# Patient Record
Sex: FEMALE | Race: WHITE | NOT HISPANIC OR LATINO | Employment: FULL TIME | URBAN - METROPOLITAN AREA
[De-identification: names, ages, dates, MRNs, and addresses within clinical notes are randomized per-mention and may not be internally consistent; named-entity substitution may affect disease eponyms.]

---

## 2024-08-06 ENCOUNTER — OFFICE VISIT (OUTPATIENT)
Dept: OBSTETRICS AND GYNECOLOGY | Facility: CLINIC | Age: 24
End: 2024-08-06
Payer: COMMERCIAL

## 2024-08-06 ENCOUNTER — LAB (OUTPATIENT)
Dept: LAB | Facility: LAB | Age: 24
End: 2024-08-06
Payer: COMMERCIAL

## 2024-08-06 VITALS — HEIGHT: 67 IN

## 2024-08-06 DIAGNOSIS — S30.1XXA TRAUMATIC ECCHYMOSIS OF GROIN, INITIAL ENCOUNTER: Primary | ICD-10-CM

## 2024-08-06 DIAGNOSIS — N91.1 SECONDARY AMENORRHEA: ICD-10-CM

## 2024-08-06 DIAGNOSIS — Z86.59 HX OF EATING DISORDER: ICD-10-CM

## 2024-08-06 DIAGNOSIS — N91.2 AMENORRHEA: ICD-10-CM

## 2024-08-06 LAB
ESTRADIOL SERPL-MCNC: 25 PG/ML
FSH SERPL-ACNC: 4.7 IU/L
PREGNANCY TEST URINE, POC: NEGATIVE
PROLACTIN SERPL-MCNC: 2.4 UG/L (ref 3–20)
TSH SERPL-ACNC: 1.83 MIU/L (ref 0.44–3.98)

## 2024-08-06 PROCEDURE — 36415 COLL VENOUS BLD VENIPUNCTURE: CPT

## 2024-08-06 PROCEDURE — 83001 ASSAY OF GONADOTROPIN (FSH): CPT

## 2024-08-06 PROCEDURE — 99203 OFFICE O/P NEW LOW 30 MIN: CPT

## 2024-08-06 PROCEDURE — 1036F TOBACCO NON-USER: CPT

## 2024-08-06 PROCEDURE — 84146 ASSAY OF PROLACTIN: CPT

## 2024-08-06 PROCEDURE — 81025 URINE PREGNANCY TEST: CPT

## 2024-08-06 PROCEDURE — 84443 ASSAY THYROID STIM HORMONE: CPT

## 2024-08-06 PROCEDURE — 82670 ASSAY OF TOTAL ESTRADIOL: CPT

## 2024-08-06 ASSESSMENT — PATIENT HEALTH QUESTIONNAIRE - PHQ9
2. FEELING DOWN, DEPRESSED OR HOPELESS: NOT AT ALL
1. LITTLE INTEREST OR PLEASURE IN DOING THINGS: NOT AT ALL
SUM OF ALL RESPONSES TO PHQ9 QUESTIONS 1 & 2: 0

## 2024-08-06 NOTE — PROGRESS NOTES
Subjective   Patient ID: Rosmery Mc is a 24 y.o. female who presents for Vaginal bump.  HPI  New patient here today for concerns of a right  groin bump that has been present for the last month. Patient reports that bump can be painful during exercise or when fabric rubs against area. Patient denies any known injury to area. Patient concerned as the bump has not resolved.     Of note patient has not had a menstrual cycle in over a year. Patient has a hx of anorexia and is currently under the care of a nutritionist. Patient declined weight at today's  visit. Patient reports exercise 6 x a week.     Review of Systems    Objective   Physical Exam  Constitutional:       Comments: underweight   Eyes:      Conjunctiva/sclera: Conjunctivae normal.   Pulmonary:      Effort: Pulmonary effort is normal.   Abdominal:          Comments: Ecchymosis noted in right groin measuring approximately 2x7 cm. 4 cm of affected area firm.    Neurological:      Mental Status: She is alert.   Psychiatric:         Mood and Affect: Mood normal.           Assessment/Plan   Right groin mass and bruising- suspicious for hematoma. Consulted with Dr. Barbosa via secure chat who suggests patient follow up with her PCP.  Patient instructed to limit activity until mass has resolved.     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 Will order estradiol, FSH, prolactin, and TSH to rule out other  possible etiologies. Will refer to endocrinology as needed.   BEVERLY Teran 08/06/24 11:16 AM

## 2024-08-07 ENCOUNTER — OFFICE VISIT (OUTPATIENT)
Dept: PRIMARY CARE | Facility: CLINIC | Age: 24
End: 2024-08-07
Payer: COMMERCIAL

## 2024-08-07 VITALS
DIASTOLIC BLOOD PRESSURE: 68 MMHG | OXYGEN SATURATION: 99 % | HEIGHT: 67 IN | TEMPERATURE: 97.5 F | HEART RATE: 54 BPM | SYSTOLIC BLOOD PRESSURE: 118 MMHG

## 2024-08-07 DIAGNOSIS — L08.9 LOCALIZED BACTERIAL SKIN INFECTION: Primary | ICD-10-CM

## 2024-08-07 DIAGNOSIS — N91.2 AMENORRHEA: Primary | ICD-10-CM

## 2024-08-07 DIAGNOSIS — B96.89 LOCALIZED BACTERIAL SKIN INFECTION: Primary | ICD-10-CM

## 2024-08-07 PROCEDURE — 1036F TOBACCO NON-USER: CPT | Performed by: NURSE PRACTITIONER

## 2024-08-07 PROCEDURE — 99214 OFFICE O/P EST MOD 30 MIN: CPT | Performed by: NURSE PRACTITIONER

## 2024-08-07 RX ORDER — CEPHALEXIN 500 MG/1
500 CAPSULE ORAL 2 TIMES DAILY
Qty: 14 CAPSULE | Refills: 0 | Status: SHIPPED | OUTPATIENT
Start: 2024-08-07 | End: 2024-08-14

## 2024-08-07 ASSESSMENT — PATIENT HEALTH QUESTIONNAIRE - PHQ9
SUM OF ALL RESPONSES TO PHQ9 QUESTIONS 1 AND 2: 0
2. FEELING DOWN, DEPRESSED OR HOPELESS: NOT AT ALL
1. LITTLE INTEREST OR PLEASURE IN DOING THINGS: NOT AT ALL

## 2024-08-07 ASSESSMENT — ENCOUNTER SYMPTOMS
LOSS OF SENSATION IN FEET: 0
OCCASIONAL FEELINGS OF UNSTEADINESS: 0
DEPRESSION: 0

## 2024-08-07 NOTE — PROGRESS NOTES
"Subjective   Patient ID: Rosmery Mc is a 24 y.o. female who presents for New Patient Visit ([facility] ), Mass (On groin area), and Referral (To endocrinologist ).    HPI   Pleasant 25 y/o female presents to [facility] care with c/o mass to right groin x 1 month. Evaluated by Gyn yesterday who recommended PCP evaluation.  Reports first noted 5 weeks ago, tender, swelling with bruising to area. Denies trauma or injury, no heat or drainage. No other swelling or lymph nodes, denies f/c/n/v, no cold symptoms, denies GI, Gyn concerns.  Endorses indoor cycle classes weekly however reports noted prior to starting classes    On exam noted approx 2 x 7 cm linear hematoma like area, indurated, tender with palpation, no heat  D/W patient, treat as bacterial infection, if no improvement or resolution will obtain US. Advised to avoid cycling, constrictive clothing until resolved  Agrees to POC  Follow up for Annual with fasting labs      Review of Systems  Review of Systems   Constitutional: Negative.    HENT: Negative.     Respiratory: Negative.     Cardiovascular: Negative.    Gastrointestinal: Negative.    Genitourinary: Negative.    Musculoskeletal: Negative.    Psychiatric/Behavioral: Negative.     All other systems reviewed and are negative.    Objective   /68 (BP Location: Left arm, Patient Position: Sitting)   Pulse 54   Temp 36.4 °C (97.5 °F)   Ht 1.702 m (5' 7\")   LMP 08/07/2023   SpO2 99%     Physical Exam  Vitals reviewed.   Constitutional:       Appearance: Normal appearance.   Cardiovascular:      Rate and Rhythm: Normal rate and regular rhythm.      Pulses: Normal pulses.      Heart sounds: Normal heart sounds.   Musculoskeletal:      Cervical back: Normal range of motion and neck supple.   Lymphadenopathy:      Cervical: No cervical adenopathy.   Skin:     Findings: Erythema, lesion and wound present. No rash.          Neurological:      Mental Status: She is alert.         Assessment/Plan "   Problem List Items Addressed This Visit    None  Visit Diagnoses         Codes    Localized bacterial skin infection    -  Primary L08.9, B96.89    Relevant Medications    cephalexin (Keflex) 500 mg capsule

## 2024-08-13 ENCOUNTER — DOCUMENTATION (OUTPATIENT)
Dept: OBSTETRICS AND GYNECOLOGY | Facility: CLINIC | Age: 24
End: 2024-08-13
Payer: COMMERCIAL

## 2024-12-06 ENCOUNTER — APPOINTMENT (OUTPATIENT)
Dept: ENDOCRINOLOGY | Facility: CLINIC | Age: 24
End: 2024-12-06
Payer: COMMERCIAL

## 2024-12-06 ENCOUNTER — LAB (OUTPATIENT)
Dept: LAB | Facility: LAB | Age: 24
End: 2024-12-06
Payer: COMMERCIAL

## 2024-12-06 VITALS
HEIGHT: 67 IN | DIASTOLIC BLOOD PRESSURE: 68 MMHG | SYSTOLIC BLOOD PRESSURE: 102 MMHG | BODY MASS INDEX: 21.03 KG/M2 | WEIGHT: 134 LBS

## 2024-12-06 DIAGNOSIS — N91.2 AMENORRHEA: ICD-10-CM

## 2024-12-06 LAB
25(OH)D3 SERPL-MCNC: 42 NG/ML (ref 30–100)
ALBUMIN SERPL BCP-MCNC: 5 G/DL (ref 3.4–5)
ALP SERPL-CCNC: 54 U/L (ref 33–110)
ALT SERPL W P-5'-P-CCNC: 14 U/L (ref 7–45)
ANION GAP SERPL CALC-SCNC: 12 MMOL/L (ref 10–20)
AST SERPL W P-5'-P-CCNC: 17 U/L (ref 9–39)
B-HCG SERPL-ACNC: <3 MIU/ML
BILIRUB SERPL-MCNC: 0.5 MG/DL (ref 0–1.2)
BUN SERPL-MCNC: 11 MG/DL (ref 6–23)
CALCIUM SERPL-MCNC: 10.1 MG/DL (ref 8.6–10.6)
CHLORIDE SERPL-SCNC: 102 MMOL/L (ref 98–107)
CO2 SERPL-SCNC: 29 MMOL/L (ref 21–32)
CORTIS SERPL-MCNC: 13.7 UG/DL (ref 2.5–20)
CREAT SERPL-MCNC: 0.71 MG/DL (ref 0.5–1.05)
DHEA-S SERPL-MCNC: 320 UG/DL (ref 65–395)
EGFRCR SERPLBLD CKD-EPI 2021: >90 ML/MIN/1.73M*2
ESTRADIOL SERPL-MCNC: 93 PG/ML
FSH SERPL-ACNC: 3.2 IU/L
GLUCOSE SERPL-MCNC: 86 MG/DL (ref 74–99)
LH SERPL-ACNC: 8.1 IU/L
POTASSIUM SERPL-SCNC: 5 MMOL/L (ref 3.5–5.3)
PROLACTIN SERPL-MCNC: 9.5 UG/L (ref 3–20)
PROT SERPL-MCNC: 7.4 G/DL (ref 6.4–8.2)
SODIUM SERPL-SCNC: 138 MMOL/L (ref 136–145)
T3FREE SERPL-MCNC: 3.5 PG/ML (ref 2.3–4.2)
T4 FREE SERPL-MCNC: 1.11 NG/DL (ref 0.78–1.48)
TSH SERPL-ACNC: 2.48 MIU/L (ref 0.44–3.98)

## 2024-12-06 PROCEDURE — 84702 CHORIONIC GONADOTROPIN TEST: CPT

## 2024-12-06 PROCEDURE — 84439 ASSAY OF FREE THYROXINE: CPT

## 2024-12-06 PROCEDURE — 82024 ASSAY OF ACTH: CPT

## 2024-12-06 PROCEDURE — 3008F BODY MASS INDEX DOCD: CPT | Performed by: INTERNAL MEDICINE

## 2024-12-06 PROCEDURE — 80053 COMPREHEN METABOLIC PANEL: CPT

## 2024-12-06 PROCEDURE — 83498 ASY HYDROXYPROGESTERONE 17-D: CPT

## 2024-12-06 PROCEDURE — 36415 COLL VENOUS BLD VENIPUNCTURE: CPT

## 2024-12-06 PROCEDURE — 84146 ASSAY OF PROLACTIN: CPT

## 2024-12-06 PROCEDURE — 99203 OFFICE O/P NEW LOW 30 MIN: CPT | Performed by: INTERNAL MEDICINE

## 2024-12-06 PROCEDURE — 82306 VITAMIN D 25 HYDROXY: CPT

## 2024-12-06 PROCEDURE — 83002 ASSAY OF GONADOTROPIN (LH): CPT

## 2024-12-06 PROCEDURE — 84481 FREE ASSAY (FT-3): CPT

## 2024-12-06 PROCEDURE — 82670 ASSAY OF TOTAL ESTRADIOL: CPT

## 2024-12-06 PROCEDURE — 84402 ASSAY OF FREE TESTOSTERONE: CPT

## 2024-12-06 PROCEDURE — 83001 ASSAY OF GONADOTROPIN (FSH): CPT

## 2024-12-06 PROCEDURE — 82627 DEHYDROEPIANDROSTERONE: CPT

## 2024-12-06 PROCEDURE — 84443 ASSAY THYROID STIM HORMONE: CPT

## 2024-12-06 PROCEDURE — 82533 TOTAL CORTISOL: CPT

## 2024-12-06 PROCEDURE — 82157 ASSAY OF ANDROSTENEDIONE: CPT

## 2024-12-06 NOTE — PROGRESS NOTES
Patient ID: Rosmery Mc is a 24 y.o. female who presents for New Patient Visit (Endocrine consult. Referred by Nehal Richmond for Amenorrhea.).  HPI  The patient is referred for evaluation of secondary amenorrhea.    This is a 24-year-old female who has not had a cycle in 1.5 years.    She states that she stopped having periods after she significantly restricted her diet and got to a weight of 105 pounds.    More recently over the last 10 months she has been working with nutrition and has gained back some weight but her periods have not come back up.    She has no premenstrual symptoms.    She states her first period was at the age of 16 or 17 and at the age of 20 based on frequent periods she was put on birth control for about 1 year but has been off of it for about 3.    She did have a evaluation which included a healthy low prolactin and estradiol that was 25 and FSH of 4.7.    She has attempted no withdrawal periods.    She has no other past medical problems.    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    Family history negative for diabetes or thyroid.    ROS  Comprehensive review of systems is negative.    Objective   Physical Exam  Visit Vitals  /68      Vitals:    12/06/24 1124   Weight: 60.8 kg (134 lb)      Body mass index is 20.99 kg/m².      Alert and oriented x3  In no distress  No focal neurologic deficits  No supraclavicular, or dorsal fat  No purple striae  Integument intact  Eyes normal  ENT normal. No adenopathy  Thyroid palpable and normal. No nodules  Chest clear to auscultation  Heart sounds are normal  Abdomen nontender. Bowel sounds normal. No organomegaly  Feet are okay  Reflexes normal with normal return    No current outpatient medications on file.     No current facility-administered medications for this visit.       Assessment/Plan     1.  Secondary amenorrhea    We discussed that this is likely hypothalamic pituitary in  origin based on the weight loss that she had previously had and that things have yet to reset themselves.    We did discuss making sure that were not missing something else.    To that extent we will check TSH free T4 free T3 FSH LH DHEA-S free and total testosterone 17-hydroxyprogesterone androstanediol on prolactin CMP vitamin D and estradiol.    We discussed potentially resetting the system with appropriate withdrawal.    We discussed possibly seeing reproductive endocrinology as well as our next step.    Follow-up with me will be dependent on results.

## 2024-12-07 NOTE — RESULT ENCOUNTER NOTE
Please inform patient initial labs are all normal including the ones that had been abnormal.  We will await the rest, but there does not seem to be an endocrine cause and she should probably see her obgyn for options to get her periods going again

## 2024-12-09 LAB — ACTH PLAS-MCNC: 26.7 PG/ML (ref 7.2–63.3)

## 2024-12-11 LAB
17OHP SERPL-MCNC: 87.88 NG/DL
ANDROST SERPL-MCNC: 1.29 NG/ML (ref 0.26–2.14)

## 2024-12-12 LAB
TESTOSTERONE FREE (CHAN): 3.3 PG/ML (ref 0.1–6.4)
TESTOSTERONE,TOTAL,LC-MS/MS: 21 NG/DL (ref 2–45)

## 2024-12-13 ENCOUNTER — DOCUMENTATION (OUTPATIENT)
Dept: OBSTETRICS AND GYNECOLOGY | Facility: CLINIC | Age: 24
End: 2024-12-13
Payer: COMMERCIAL

## 2024-12-13 NOTE — PROGRESS NOTES
Patient had reached out to this provider 12/9 to discuss continued management of hypothalamic amenorrhea as she had seen her endocrinologist and was referred back to OB.  Consulted with Dr. Mcguire who suggest patient be placed on OCPs as she has still not started her menstrual cycle and potentially consider DEXA scan.    Called patient today to discuss recommendations per Dr. Mcguire and patient reports she just started her menstrual cycle.    Patient has been working with a nutritionist and has been gaining weight.  Patient would like to avoid hormones at this time, or any further testing as things seem to be improving.    Patient to reach back out to this provider if she would like to pursue medication management.    Nehal Richmond, MAICO-NITO